# Patient Record
Sex: FEMALE | Race: WHITE | NOT HISPANIC OR LATINO | Employment: OTHER | URBAN - METROPOLITAN AREA
[De-identification: names, ages, dates, MRNs, and addresses within clinical notes are randomized per-mention and may not be internally consistent; named-entity substitution may affect disease eponyms.]

---

## 2023-03-09 LAB
CHOLESTEROL (MG/DL) IN SER/PLAS: 150 MG/DL (ref 0–199)
CHOLESTEROL IN HDL (MG/DL) IN SER/PLAS: 57.5 MG/DL
CHOLESTEROL/HDL RATIO: 2.6
LDL: 78 MG/DL (ref 0–99)
NATRIURETIC PEPTIDE B (PG/ML) IN SER/PLAS: 105 PG/ML (ref 0–99)
THYROTROPIN (MIU/L) IN SER/PLAS BY DETECTION LIMIT <= 0.05 MIU/L: 3.39 MIU/L (ref 0.44–3.98)
TRIGLYCERIDE (MG/DL) IN SER/PLAS: 75 MG/DL (ref 0–149)
VLDL: 15 MG/DL (ref 0–40)

## 2023-03-10 LAB
APPEARANCE, URINE: NORMAL
ASCORBIC ACID: NORMAL MG/DL
BILIRUBIN, URINE: NORMAL
BLOOD, URINE: NORMAL
COLOR, URINE: NORMAL
GLUCOSE, URINE: NORMAL
KETONES, URINE: NORMAL
LEUKOCYTE ESTERASE, URINE: NORMAL
NITRITE, URINE: NORMAL
PH, URINE: NORMAL
PROTEIN, URINE: NORMAL
SPECIFIC GRAVITY, URINE: NORMAL
UROBILINOGEN, URINE: NORMAL

## 2023-03-16 PROBLEM — H35.81 MACULAR EDEMA: Status: ACTIVE | Noted: 2023-03-16

## 2023-04-07 ENCOUNTER — TELEPHONE (OUTPATIENT)
Dept: PRIMARY CARE | Facility: CLINIC | Age: 78
End: 2023-04-07
Payer: MEDICARE

## 2023-04-18 ENCOUNTER — TELEPHONE (OUTPATIENT)
Dept: PRIMARY CARE | Facility: CLINIC | Age: 78
End: 2023-04-18
Payer: MEDICARE

## 2023-04-18 DIAGNOSIS — R60.0 LOCALIZED EDEMA: ICD-10-CM

## 2023-04-24 ENCOUNTER — APPOINTMENT (OUTPATIENT)
Dept: PRIMARY CARE | Facility: CLINIC | Age: 78
End: 2023-04-24
Payer: MEDICARE

## 2023-04-25 ENCOUNTER — APPOINTMENT (OUTPATIENT)
Dept: PRIMARY CARE | Facility: CLINIC | Age: 78
End: 2023-04-25
Payer: MEDICARE

## 2023-04-28 ENCOUNTER — OFFICE VISIT (OUTPATIENT)
Dept: PRIMARY CARE | Facility: CLINIC | Age: 78
End: 2023-04-28
Payer: MEDICARE

## 2023-04-28 ENCOUNTER — LAB (OUTPATIENT)
Dept: LAB | Facility: LAB | Age: 78
End: 2023-04-28
Payer: MEDICARE

## 2023-04-28 VITALS
TEMPERATURE: 97.3 F | BODY MASS INDEX: 18.78 KG/M2 | WEIGHT: 110 LBS | HEART RATE: 107 BPM | SYSTOLIC BLOOD PRESSURE: 106 MMHG | HEIGHT: 64 IN | OXYGEN SATURATION: 90 % | DIASTOLIC BLOOD PRESSURE: 50 MMHG

## 2023-04-28 DIAGNOSIS — D80.9 IMMUNOGLOBULIN DEFICIENCY (MULTI): ICD-10-CM

## 2023-04-28 DIAGNOSIS — R60.0 LOCALIZED EDEMA: ICD-10-CM

## 2023-04-28 DIAGNOSIS — D80.1 COMMON VARIABLE AGAMMAGLOBULINEMIA (MULTI): ICD-10-CM

## 2023-04-28 DIAGNOSIS — R60.0 EDEMA OF BOTH LOWER EXTREMITIES: ICD-10-CM

## 2023-04-28 DIAGNOSIS — D80.2 IGA DEFICIENCY (MULTI): ICD-10-CM

## 2023-04-28 DIAGNOSIS — R18.8 CIRRHOSIS OF LIVER WITH ASCITES, UNSPECIFIED HEPATIC CIRRHOSIS TYPE (MULTI): ICD-10-CM

## 2023-04-28 DIAGNOSIS — K74.60 CIRRHOSIS OF LIVER WITH ASCITES, UNSPECIFIED HEPATIC CIRRHOSIS TYPE (MULTI): ICD-10-CM

## 2023-04-28 DIAGNOSIS — K74.60 CIRRHOSIS OF LIVER WITH ASCITES, UNSPECIFIED HEPATIC CIRRHOSIS TYPE (MULTI): Primary | ICD-10-CM

## 2023-04-28 DIAGNOSIS — R18.8 CIRRHOSIS OF LIVER WITH ASCITES, UNSPECIFIED HEPATIC CIRRHOSIS TYPE (MULTI): Primary | ICD-10-CM

## 2023-04-28 PROBLEM — L40.9 PSORIASIS: Status: ACTIVE | Noted: 2023-04-28

## 2023-04-28 PROBLEM — R26.81 UNSTEADY GAIT WHEN WALKING: Status: ACTIVE | Noted: 2023-04-28

## 2023-04-28 PROBLEM — E78.2 MIXED HYPERLIPIDEMIA: Status: ACTIVE | Noted: 2023-04-28

## 2023-04-28 PROBLEM — H35.81 MACULAR RETINAL EDEMA: Status: ACTIVE | Noted: 2023-03-16

## 2023-04-28 PROBLEM — C44.310 BASAL CELL CARCINOMA, FACE: Status: ACTIVE | Noted: 2023-04-28

## 2023-04-28 PROBLEM — E55.9 VITAMIN D DEFICIENCY: Status: ACTIVE | Noted: 2023-04-28

## 2023-04-28 LAB
ALANINE AMINOTRANSFERASE (SGPT) (U/L) IN SER/PLAS: 28 U/L (ref 7–45)
ALBUMIN (G/DL) IN SER/PLAS: 3.3 G/DL (ref 3.4–5)
ALKALINE PHOSPHATASE (U/L) IN SER/PLAS: 156 U/L (ref 33–136)
ANION GAP IN SER/PLAS: 13 MMOL/L (ref 10–20)
ASPARTATE AMINOTRANSFERASE (SGOT) (U/L) IN SER/PLAS: 43 U/L (ref 9–39)
BILIRUBIN TOTAL (MG/DL) IN SER/PLAS: 3.5 MG/DL (ref 0–1.2)
CALCIUM (MG/DL) IN SER/PLAS: 8.9 MG/DL (ref 8.6–10.6)
CARBON DIOXIDE, TOTAL (MMOL/L) IN SER/PLAS: 32 MMOL/L (ref 21–32)
CHLORIDE (MMOL/L) IN SER/PLAS: 101 MMOL/L (ref 98–107)
CREATININE (MG/DL) IN SER/PLAS: 0.64 MG/DL (ref 0.5–1.05)
ERYTHROCYTE DISTRIBUTION WIDTH (RATIO) BY AUTOMATED COUNT: 16.5 % (ref 11.5–14.5)
ERYTHROCYTE MEAN CORPUSCULAR HEMOGLOBIN CONCENTRATION (G/DL) BY AUTOMATED: 34 G/DL (ref 32–36)
ERYTHROCYTE MEAN CORPUSCULAR VOLUME (FL) BY AUTOMATED COUNT: 97 FL (ref 80–100)
ERYTHROCYTES (10*6/UL) IN BLOOD BY AUTOMATED COUNT: 4.02 X10E12/L (ref 4–5.2)
GFR FEMALE: 89 ML/MIN/1.73M2
GLUCOSE (MG/DL) IN SER/PLAS: 90 MG/DL (ref 74–99)
HEMATOCRIT (%) IN BLOOD BY AUTOMATED COUNT: 39.1 % (ref 36–46)
HEMOGLOBIN (G/DL) IN BLOOD: 13.3 G/DL (ref 12–16)
LEUKOCYTES (10*3/UL) IN BLOOD BY AUTOMATED COUNT: 7.8 X10E9/L (ref 4.4–11.3)
NRBC (PER 100 WBCS) BY AUTOMATED COUNT: 0 /100 WBC (ref 0–0)
PLATELETS (10*3/UL) IN BLOOD AUTOMATED COUNT: 159 X10E9/L (ref 150–450)
POTASSIUM (MMOL/L) IN SER/PLAS: 3.2 MMOL/L (ref 3.5–5.3)
PROTEIN TOTAL: 4.8 G/DL (ref 6.4–8.2)
SODIUM (MMOL/L) IN SER/PLAS: 143 MMOL/L (ref 136–145)
UREA NITROGEN (MG/DL) IN SER/PLAS: 21 MG/DL (ref 6–23)

## 2023-04-28 PROCEDURE — 1159F MED LIST DOCD IN RCRD: CPT | Performed by: FAMILY MEDICINE

## 2023-04-28 PROCEDURE — 1160F RVW MEDS BY RX/DR IN RCRD: CPT | Performed by: FAMILY MEDICINE

## 2023-04-28 PROCEDURE — 1036F TOBACCO NON-USER: CPT | Performed by: FAMILY MEDICINE

## 2023-04-28 PROCEDURE — 1157F ADVNC CARE PLAN IN RCRD: CPT | Performed by: FAMILY MEDICINE

## 2023-04-28 PROCEDURE — 36415 COLL VENOUS BLD VENIPUNCTURE: CPT

## 2023-04-28 PROCEDURE — 99214 OFFICE O/P EST MOD 30 MIN: CPT | Performed by: FAMILY MEDICINE

## 2023-04-28 PROCEDURE — 3078F DIAST BP <80 MM HG: CPT | Performed by: FAMILY MEDICINE

## 2023-04-28 PROCEDURE — 85027 COMPLETE CBC AUTOMATED: CPT

## 2023-04-28 PROCEDURE — 3074F SYST BP LT 130 MM HG: CPT | Performed by: FAMILY MEDICINE

## 2023-04-28 PROCEDURE — 80053 COMPREHEN METABOLIC PANEL: CPT

## 2023-04-28 RX ORDER — AMLODIPINE BESYLATE 5 MG/1
5 TABLET ORAL DAILY
Qty: 90 TABLET | Refills: 3 | Status: SHIPPED | OUTPATIENT
Start: 2023-04-28 | End: 2023-05-13 | Stop reason: CLARIF

## 2023-04-28 RX ORDER — CALCIUM CITRATE/VITAMIN D3 200MG-6.25
TABLET ORAL
COMMUNITY
Start: 2015-07-24

## 2023-04-28 RX ORDER — AMLODIPINE BESYLATE 5 MG/1
5 TABLET ORAL DAILY
COMMUNITY
End: 2023-04-28 | Stop reason: SDUPTHER

## 2023-04-28 RX ORDER — SPIRONOLACTONE 25 MG/1
25 TABLET ORAL 2 TIMES DAILY
COMMUNITY
Start: 2023-04-06 | End: 2023-04-28 | Stop reason: SDUPTHER

## 2023-04-28 RX ORDER — FUROSEMIDE 20 MG/1
20 TABLET ORAL DAILY
Qty: 90 TABLET | Refills: 3 | Status: SHIPPED | OUTPATIENT
Start: 2023-04-28 | End: 2023-05-13 | Stop reason: CLARIF

## 2023-04-28 RX ORDER — SPIRONOLACTONE 25 MG/1
TABLET ORAL
Qty: 60 TABLET | Status: CANCELLED | OUTPATIENT
Start: 2023-04-28

## 2023-04-28 RX ORDER — FUROSEMIDE 20 MG/1
TABLET ORAL
Qty: 30 TABLET | Refills: 2 | Status: CANCELLED | OUTPATIENT
Start: 2023-04-28

## 2023-04-28 RX ORDER — SPIRONOLACTONE 25 MG/1
25 TABLET ORAL 2 TIMES DAILY
Qty: 180 TABLET | Refills: 3 | Status: SHIPPED | OUTPATIENT
Start: 2023-04-28 | End: 2023-05-13 | Stop reason: CLARIF

## 2023-04-28 ASSESSMENT — LIFESTYLE VARIABLES
SKIP TO QUESTIONS 9-10: 1
HOW OFTEN DO YOU HAVE SIX OR MORE DRINKS ON ONE OCCASION: NEVER
AUDIT-C TOTAL SCORE: 0
HOW MANY STANDARD DRINKS CONTAINING ALCOHOL DO YOU HAVE ON A TYPICAL DAY: PATIENT DOES NOT DRINK
HOW OFTEN DO YOU HAVE A DRINK CONTAINING ALCOHOL: NEVER

## 2023-04-28 NOTE — PROGRESS NOTES
Subjective   Patient ID: Yenni Zamora is a 77 y.o. female who presents for Hospital Follow-up.    Assessment/Plan     Problem List Items Addressed This Visit          Musculoskeletal    Edema of both lower extremities       Immune    Common variable agammaglobulinemia (CMS/HCC)    IgA deficiency (CMS/HCC)    Immunoglobulin deficiency (CMS/HCC)     Other Visit Diagnoses       Cirrhosis of liver with ascites, unspecified hepatic cirrhosis type (CMS/HCC)    -  Primary    Relevant Orders    CBC    Comprehensive Metabolic Panel    Localized edema            declined mammogram and bone density scan and colorectal cancer screening  Patient received new shingles vaccine  Recent lab work done reviewed  Continue current medication and call for refill  Last progress note from hematology oncology reviewed. Getting infusion every 4 weeks.  Previous visit  Bilateral lower extremity skin rash consider dermatology referral  Follow-up in 6 months come back early with any new signs and symptoms    HPI      77-year-old female was recently admitted to hospital with most likely gram-negative pneumonia treated with IV antibiotic  We will switch to p.o. antibiotic  Also patient had elevated LFT for which right upper quadrant ultrasound was done showed 1.6 cm hypoechoic lesion with ascites for which CT abdomen pelvis was done showed cirrhosis with significant amount of ascites with portal hypertension with upper abdominal crisis  GI consult was done  S/p paracentesis with removal of 1.6 L of fluid  Hypertension is panel negative  Fluid negative for SBP    Smooth muscle antibody - ve  Antiemetic mitochondrial antibody negative  cerulo plasmin level within normal limits alpha 1 antitrypsin level on the lower side    Will be seen by GI  Bilateral lower extremity swelling still present      Started on furosemide spironolactone we will continue    Hypertension hyperlipidemia  Intracranial hemorrhage CVA  Retinal edema 6  "retinologist    History of humerus fracture  Carpal tunnel  Psoriasis  Common variable immunodeficiency syndrome receiving IVIG  Hypogammaglobulinemia following with heme-onc  Thrombocytopenia  Abnormal weight loss gained weight     Complaining of bilateral skin rash  following up with hematology oncology and hematology  Taking medication as prescribed  Chronic bilateral knee pain on and off mostly secondary to osteoarthritis   Allergies   Allergen Reactions    Penicillins Hives    Codeine Rash       Current Outpatient Medications   Medication Sig Dispense Refill    amLODIPine (Norvasc) 5 mg tablet Take 1 tablet (5 mg) by mouth once daily. for blood pressure      aura cit-mag-D3-Zn--man-bor (Citracal-D3 Plus Magnesium) 250- mg-mg-unit tablet Take by mouth.      furosemide (Lasix) 20 mg tablet TAKE 1 TABLET BY MOUTH DAILY EVERY THIRD DAY 30 tablet 2    spironolactone (Aldactone) 25 mg tablet 1 tablet (25 mg) 2 times a day.       No current facility-administered medications for this visit.       Objective   Visit Vitals  /50 (BP Location: Left arm, Patient Position: Sitting)   Pulse 107   Temp 36.3 °C (97.3 °F)   Ht 1.626 m (5' 4\")   Wt 49.9 kg (110 lb)   SpO2 90%   BMI 18.88 kg/m²   Smoking Status Never   BSA 1.5 m²     Physical Exam  Constitutional:       General: He is not in acute distress.     Appearance: Normal appearance.   HENT:      Head: Normocephalic and atraumatic.      Nose: Nose normal.   Eyes:      Extraocular Movements: Extraocular movements intact.      Conjunctiva/sclera: Conjunctivae normal.   Cardiovascular:      Rate and Rhythm: Normal rate and regular rhythm.   Pulmonary:      Effort: Pulmonary effort is normal.      Breath sounds: Normal breath sounds.   Skin:     General: Skin is warm.   Neurological:      Mental Status: He is alert and oriented to person, place, and time.   Psychiatric:         Mood and Affect: Mood normal.         Behavior: Behavior normal.   Immunization " History   Administered Date(s) Administered    Horace SARS-CoV-2 Vaccination 03/06/2021    Moderna SARS-CoV-2 Vaccination 12/15/2021, 05/16/2022       Review of Systems    Orders Only on 03/09/2023   Component Date Value Ref Range Status    Color, Urine 03/09/2023 CANCELED   Final-Edited    Appearance, Urine 03/09/2023 CANCELED   Final-Edited    Specific Gravity, Urine 03/09/2023 CANCELED   Final-Edited    pH, Urine 03/09/2023 CANCELED   Final-Edited    Protein, Urine 03/09/2023 CANCELED   Final-Edited    Glucose, Urine 03/09/2023 CANCELED   Final-Edited    Blood, Urine 03/09/2023 CANCELED   Final-Edited    Ketones, Urine 03/09/2023 CANCELED   Final-Edited    Bilirubin, Urine 03/09/2023 CANCELED   Final-Edited    Urobilinogen, Urine 03/09/2023 CANCELED   Final-Edited    Nitrite, Urine 03/09/2023 CANCELED   Final-Edited    Leukocyte Esterase, Urine 03/09/2023 CANCELED   Final-Edited    Ascorbic Acid 03/09/2023 CANCELED  mg/dL Final-Edited   Orders Only on 03/09/2023   Component Date Value Ref Range Status    BNP 03/09/2023 105 (H)  0 - 99 pg/mL Final   Orders Only on 03/09/2023   Component Date Value Ref Range Status    Cholesterol 03/09/2023 150  0 - 199 mg/dL Final    HDL 03/09/2023 57.5  mg/dL Final    Cholesterol/HDL Ratio 03/09/2023 2.6   Final    LDL 03/09/2023 78  0 - 99 mg/dL Final    VLDL 03/09/2023 15  0 - 40 mg/dL Final    Triglycerides 03/09/2023 75  0 - 149 mg/dL Final   Orders Only on 03/09/2023   Component Date Value Ref Range Status    TSH 03/09/2023 3.39  0.44 - 3.98 mIU/L Final       Radiology: Reviewed imaging in powerchart.  No results found.    No family history on file.  Social History     Socioeconomic History    Marital status:      Spouse name: None    Number of children: None    Years of education: None    Highest education level: None   Occupational History    None   Tobacco Use    Smoking status: Never    Smokeless tobacco: Never   Vaping Use    Vaping status: None   Substance and  Sexual Activity    Alcohol use: Never    Drug use: Never    Sexual activity: None   Other Topics Concern    None   Social History Narrative    None     Social Determinants of Health     Financial Resource Strain: Not on file   Food Insecurity: Not on file   Transportation Needs: Not on file   Physical Activity: Not on file   Stress: Not on file   Social Connections: Not on file   Intimate Partner Violence: Not on file   Housing Stability: Not on file     Past Medical History:   Diagnosis Date    Disorder involving the immune mechanism, unspecified (CMS/[facility]) 04/17/2015    Immune disorder    Diverticulosis of intestine, part unspecified, without perforation or abscess without bleeding 11/05/2013    Diverticulosis    Personal history of other diseases of the circulatory system     History of valvular heart disease    Personal history of other diseases of the circulatory system     History of cardiac disorder    Personal history of other diseases of the nervous system and sense organs     History of myopia    Personal history of other diseases of the nervous system and sense organs     History of glaucoma    Personal history of other diseases of the nervous system and sense organs     History of cataract    Personal history of other diseases of the respiratory system     Personal history of chronic sinusitis    Personal history of other diseases of the respiratory system     Personal history of lung disease    Personal history of other diseases of urinary system     History of pyelonephritis     Past Surgical History:   Procedure Laterality Date    CATARACT EXTRACTION  10/15/2014    Cataract Surgery    CHOLECYSTECTOMY  02/21/2014    Cholecystectomy    TOTAL ABDOMINAL HYSTERECTOMY W/ BILATERAL SALPINGOOPHORECTOMY  02/21/2014    Total Abdominal Hysterectomy With Removal Of Both Ovaries    TOTAL KNEE ARTHROPLASTY  02/21/2014    Total Knee Arthroplasty       Charting was completed using voice recognition technology and may  include unintended errors.

## 2023-05-01 ENCOUNTER — TELEPHONE (OUTPATIENT)
Dept: PRIMARY CARE | Facility: CLINIC | Age: 78
End: 2023-05-01
Payer: MEDICARE

## 2023-05-01 NOTE — TELEPHONE ENCOUNTER
----- Message from Henry Saeed MD sent at 4/29/2023  2:16 PM EDT -----  Please call the patient regarding her abnormal result.  Potassium is low patient needs to continue potassium 20 mill equivalents daily.  Patient does have potassium at home does confirm the dose when provider refill as it would be a daily pill.  Also liver function is abnormal remind her to call Dr. Miles's office and schedule appointment